# Patient Record
Sex: FEMALE | Race: WHITE
[De-identification: names, ages, dates, MRNs, and addresses within clinical notes are randomized per-mention and may not be internally consistent; named-entity substitution may affect disease eponyms.]

---

## 2018-03-02 ENCOUNTER — HOSPITAL ENCOUNTER (EMERGENCY)
Dept: HOSPITAL 5 - ED | Age: 68
Discharge: HOME | End: 2018-03-02
Payer: SELF-PAY

## 2018-03-02 VITALS — SYSTOLIC BLOOD PRESSURE: 137 MMHG | DIASTOLIC BLOOD PRESSURE: 68 MMHG

## 2018-03-02 DIAGNOSIS — E11.65: Primary | ICD-10-CM

## 2018-03-02 DIAGNOSIS — Z88.0: ICD-10-CM

## 2018-03-02 DIAGNOSIS — Z90.710: ICD-10-CM

## 2018-03-02 PROCEDURE — 99283 EMERGENCY DEPT VISIT LOW MDM: CPT

## 2018-03-02 PROCEDURE — 82962 GLUCOSE BLOOD TEST: CPT

## 2018-03-02 PROCEDURE — 96360 HYDRATION IV INFUSION INIT: CPT

## 2018-03-02 NOTE — EMERGENCY DEPARTMENT REPORT
ED General Adult HPI





- General


Chief complaint: Hyperglycemia


Stated complaint: BLOOD SUGAR HIGH


Time Seen by Provider: 03/02/18 18:27


Source: patient


Mode of arrival: Ambulatory


Limitations: No Limitations





- History of Present Illness


Initial comments: 





Patient is a 67-year-old female who is presenting with high blood sugar.  

Patient states that she was on Janumet but this medication is over 1000 almost 

a month and she cannot afford it anymore.  The patient says she does have some 

mild polydipsia polyuria but other than that she feels well otherwise.  She's 

had no abdominal pain nausea vomiting abdominal cramps.  Patient is here for 

options as far as I can she get her meds.





- Related Data


 Allergies











Allergy/AdvReac Type Severity Reaction Status Date / Time


 


Penicillins Allergy  Itching Verified 03/02/18 16:28














ED Review of Systems


ROS: 


Stated complaint: BLOOD SUGAR HIGH


Other details as noted in HPI





Comment: All other systems reviewed and negative





ED Past Medical Hx





- Past Medical History


Hx Diabetes: Yes





- Surgical History


Additional Surgical History: hysterectomy, bladder repair





- Social History


Smoking Status: Never Smoker


Substance Use Type: None





ED Physical Exam





- General


Limitations: No Limitations


General appearance: alert, in no apparent distress





- Head


Head exam: Present: atraumatic, normocephalic





- Eye


Eye exam: Present: normal appearance





- ENT


ENT exam: Present: mucous membranes moist





- Neck


Neck exam: Present: normal inspection





- Respiratory


Respiratory exam: Present: normal lung sounds bilaterally.  Absent: respiratory 

distress





- Cardiovascular


Cardiovascular Exam: Present: regular rate, normal rhythm.  Absent: systolic 

murmur, diastolic murmur, rubs, gallop





- GI/Abdominal


GI/Abdominal exam: Present: soft, normal bowel sounds





- Extremities Exam


Extremities exam: Present: normal inspection





- Back Exam


Back exam: Present: normal inspection





- Neurological Exam


Neurological exam: Present: alert, oriented X3





- Psychiatric


Psychiatric exam: Present: normal affect, normal mood





- Skin


Skin exam: Present: warm, dry, intact, normal color.  Absent: rash





ED Course





 Vital Signs











  03/02/18





  16:29


 


Temperature 98.4 F


 


Pulse Rate 80


 


Respiratory 16





Rate 


 


Blood Pressure 140/65


 


O2 Sat by Pulse 98





Oximetry 














ED Medical Decision Making





- Medical Decision Making





Patient's glucose was approximate 300.  Patient filling well and has been 

hydrated and will be discharged home


Critical care attestation.: 


If time is entered above; I have spent that time in minutes in the direct care 

of this critically ill patient, excluding procedure time.








ED Disposition


Clinical Impression: 


 Hyperglycemia





Disposition: DC-01 TO HOME OR SELFCARE


Is pt being admited?: No


Does the pt Need Aspirin: No


Condition: Stable


Additional Instructions: 


Least take your glimepiride 2 tablets daily


Referrals: 


PRIMARY CARE,MD [Primary Care Provider] - 3-5 Days